# Patient Record
Sex: MALE | URBAN - METROPOLITAN AREA
[De-identification: names, ages, dates, MRNs, and addresses within clinical notes are randomized per-mention and may not be internally consistent; named-entity substitution may affect disease eponyms.]

---

## 2021-07-22 ENCOUNTER — APPOINTMENT (RX ONLY)
Dept: URBAN - METROPOLITAN AREA CLINIC 23 | Facility: CLINIC | Age: 26
Setting detail: DERMATOLOGY
End: 2021-07-22

## 2021-07-22 DIAGNOSIS — L21.8 OTHER SEBORRHEIC DERMATITIS: ICD-10-CM

## 2021-07-22 DIAGNOSIS — L30.9 DERMATITIS, UNSPECIFIED: ICD-10-CM

## 2021-07-22 PROCEDURE — 99203 OFFICE O/P NEW LOW 30 MIN: CPT

## 2021-07-22 PROCEDURE — ? PRESCRIPTION MEDICATION MANAGEMENT

## 2021-07-22 PROCEDURE — ? PRESCRIPTION

## 2021-07-22 PROCEDURE — ? COUNSELING

## 2021-07-22 RX ORDER — KETOCONAZOLE 20 MG/ML
SHAMPOO, SUSPENSION TOPICAL
Qty: 1 | Refills: 11 | Status: ERX | COMMUNITY
Start: 2021-07-22

## 2021-07-22 RX ORDER — IVERMECTIN 3 MG/1
TABLET ORAL
Qty: 6 | Refills: 0 | Status: ERX | COMMUNITY
Start: 2021-07-22

## 2021-07-22 RX ORDER — PERMETHRIN 50 MG/G
CREAM TOPICAL QD
Qty: 1 | Refills: 1 | Status: ERX | COMMUNITY
Start: 2021-07-22

## 2021-07-22 RX ORDER — TRIAMCINOLONE ACETONIDE 1 MG/G
OINTMENT TOPICAL BID
Qty: 1 | Refills: 1 | Status: ERX | COMMUNITY
Start: 2021-07-22

## 2021-07-22 RX ADMIN — PERMETHRIN 1: 50 CREAM TOPICAL at 00:00

## 2021-07-22 RX ADMIN — KETOCONAZOLE 1: 20 SHAMPOO, SUSPENSION TOPICAL at 00:00

## 2021-07-22 RX ADMIN — TRIAMCINOLONE ACETONIDE 1: 1 OINTMENT TOPICAL at 00:00

## 2021-07-22 RX ADMIN — IVERMECTIN 1: 3 TABLET ORAL at 00:00

## 2021-07-22 ASSESSMENT — LOCATION SIMPLE DESCRIPTION DERM
LOCATION SIMPLE: CHEST
LOCATION SIMPLE: RIGHT FOREARM
LOCATION SIMPLE: LEFT UPPER ARM
LOCATION SIMPLE: RIGHT HAND
LOCATION SIMPLE: LEFT FOREARM
LOCATION SIMPLE: LEFT HAND
LOCATION SIMPLE: UPPER BACK
LOCATION SIMPLE: RIGHT ELBOW
LOCATION SIMPLE: LEFT SCALP

## 2021-07-22 ASSESSMENT — LOCATION DETAILED DESCRIPTION DERM
LOCATION DETAILED: INFERIOR THORACIC SPINE
LOCATION DETAILED: LEFT ANTERIOR PROXIMAL UPPER ARM
LOCATION DETAILED: LEFT PROXIMAL DORSAL FOREARM
LOCATION DETAILED: RIGHT ELBOW
LOCATION DETAILED: RIGHT VENTRAL PROXIMAL FOREARM
LOCATION DETAILED: LEFT VENTRAL PROXIMAL FOREARM
LOCATION DETAILED: LEFT MEDIAL FRONTAL SCALP
LOCATION DETAILED: LEFT ULNAR DORSAL HAND
LOCATION DETAILED: RIGHT ULNAR DORSAL HAND
LOCATION DETAILED: RIGHT LATERAL INFERIOR CHEST

## 2021-07-22 ASSESSMENT — LOCATION ZONE DERM
LOCATION ZONE: HAND
LOCATION ZONE: TRUNK
LOCATION ZONE: ARM
LOCATION ZONE: SCALP

## 2021-07-22 NOTE — PROCEDURE: PRESCRIPTION MEDICATION MANAGEMENT
Render In Strict Bullet Format?: No
Detail Level: Zone
Initiate Treatment: permethrin 5 % topical cream QD\\nQuantity: 1.0 Container  Days Supply: 30\\nSig: Apply scalp down to feet overnight x 8 hours. Wash off in AM. Repeat in exactly 1 week.\\n\\ntriamcinolone acetonide 0.1 % topical ointment BID\\nQuantity: 1.0 Tube  Days Supply: 30\\nSig: Apply to affected areas twice daily for two weeks on and 1 week off, then as needed for flares.\\n\\nivermectin 3 mg tablet \\nQuantity: 6.0 Tablet  Days Supply: 1\\nSig: Take 3 pills on day one ,then repeat in 7 days.
Initiate Treatment: ketoconazole 2 % shampoo Three times a week\\nQuantity: 1.0 Bottle  Days Supply: 30\\nSig: Apply to scalp when showering QD a week. Lather and let sit for 5 minutes then rinse. Can be drying, use conditioner.

## 2021-09-16 ENCOUNTER — APPOINTMENT (RX ONLY)
Dept: URBAN - METROPOLITAN AREA CLINIC 23 | Facility: CLINIC | Age: 26
Setting detail: DERMATOLOGY
End: 2021-09-16

## 2021-09-16 DIAGNOSIS — L81.0 POSTINFLAMMATORY HYPERPIGMENTATION: ICD-10-CM

## 2021-09-16 DIAGNOSIS — L30.9 DERMATITIS, UNSPECIFIED: ICD-10-CM | Status: UNCHANGED

## 2021-09-16 PROCEDURE — 99213 OFFICE O/P EST LOW 20 MIN: CPT

## 2021-09-16 PROCEDURE — ? COUNSELING

## 2021-09-16 PROCEDURE — ? PRESCRIPTION

## 2021-09-16 PROCEDURE — ? DEFER

## 2021-09-16 PROCEDURE — ? PRESCRIPTION MEDICATION MANAGEMENT

## 2021-09-16 RX ORDER — HYDROXYZINE HYDROCHLORIDE 10 MG/1
1 TABLET, FILM COATED ORAL QHS
Qty: 30 | Refills: 1 | Status: ERX | COMMUNITY
Start: 2021-09-16

## 2021-09-16 RX ORDER — IVERMECTIN 3 MG/1
1 TABLET ORAL TID
Qty: 6 | Refills: 0 | Status: ERX

## 2021-09-16 RX ADMIN — HYDROXYZINE HYDROCHLORIDE 1: 10 TABLET, FILM COATED ORAL at 00:00

## 2021-09-16 ASSESSMENT — LOCATION DETAILED DESCRIPTION DERM
LOCATION DETAILED: RIGHT VENTRAL PROXIMAL FOREARM
LOCATION DETAILED: LEFT PROXIMAL DORSAL FOREARM
LOCATION DETAILED: INFERIOR THORACIC SPINE
LOCATION DETAILED: RIGHT LATERAL INFERIOR CHEST
LOCATION DETAILED: LEFT DISTAL DORSAL FOREARM
LOCATION DETAILED: RIGHT ULNAR DORSAL HAND
LOCATION DETAILED: LEFT ANTERIOR PROXIMAL UPPER ARM
LOCATION DETAILED: RIGHT ELBOW
LOCATION DETAILED: RIGHT PROXIMAL DORSAL FOREARM
LOCATION DETAILED: LEFT VENTRAL PROXIMAL FOREARM
LOCATION DETAILED: LEFT ULNAR DORSAL HAND

## 2021-09-16 ASSESSMENT — LOCATION SIMPLE DESCRIPTION DERM
LOCATION SIMPLE: LEFT HAND
LOCATION SIMPLE: UPPER BACK
LOCATION SIMPLE: RIGHT FOREARM
LOCATION SIMPLE: RIGHT ELBOW
LOCATION SIMPLE: LEFT FOREARM
LOCATION SIMPLE: LEFT UPPER ARM
LOCATION SIMPLE: CHEST
LOCATION SIMPLE: RIGHT HAND

## 2021-09-16 ASSESSMENT — LOCATION ZONE DERM
LOCATION ZONE: HAND
LOCATION ZONE: TRUNK
LOCATION ZONE: ARM

## 2021-09-16 NOTE — PROCEDURE: DEFER
Procedure To Be Performed At Next Visit: Biopsy by punch method
Introduction Text (Please End With A Colon): Due to pt being self pay he will reschedule in 2 weeks for biopsy when he has funds available, will keep him on topicals for now to help manage symptoms
Detail Level: Detailed

## 2021-09-16 NOTE — PROCEDURE: PRESCRIPTION MEDICATION MANAGEMENT
Continue Regimen: ivermectin 3 mg tablet Tid\\nSig: Take 3 pills on day one ,then repeat in 7 days.\\n\\ntriamcinolone acetonide 0.1 % topical ointment BID\\nSig: Apply to affected areas twice daily for two weeks on and 1 week off, then as needed for flares.
Render In Strict Bullet Format?: No
Detail Level: Zone
Initiate Treatment: hydroxyzine HCl 10 mg tablet QHS\\nSig: Take one pill once daily at bedtime as needed.